# Patient Record
Sex: FEMALE | Race: WHITE | URBAN - METROPOLITAN AREA
[De-identification: names, ages, dates, MRNs, and addresses within clinical notes are randomized per-mention and may not be internally consistent; named-entity substitution may affect disease eponyms.]

---

## 2017-03-22 ENCOUNTER — OFFICE VISIT (OUTPATIENT)
Dept: INTERNAL MEDICINE CLINIC | Facility: CLINIC | Age: 27
End: 2017-03-22

## 2017-03-22 VITALS
HEIGHT: 59 IN | HEART RATE: 68 BPM | TEMPERATURE: 99 F | BODY MASS INDEX: 26.41 KG/M2 | WEIGHT: 131 LBS | RESPIRATION RATE: 16 BRPM | DIASTOLIC BLOOD PRESSURE: 70 MMHG | SYSTOLIC BLOOD PRESSURE: 120 MMHG

## 2017-03-22 DIAGNOSIS — Z30.018 ENCOUNTER FOR INITIAL PRESCRIPTION OF OTHER CONTRACEPTIVES: Primary | ICD-10-CM

## 2017-03-22 LAB
CONTROL LINE PRESENT WITH A CLEAR BACKGROUND (YES/NO): YES YES/NO
KIT LOT #: NORMAL NUMERIC

## 2017-03-22 PROCEDURE — 81025 URINE PREGNANCY TEST: CPT | Performed by: FAMILY MEDICINE

## 2017-03-22 PROCEDURE — 99203 OFFICE O/P NEW LOW 30 MIN: CPT | Performed by: FAMILY MEDICINE

## 2017-03-22 RX ORDER — LEVONORGESTREL AND ETHINYL ESTRADIOL 0.1-0.02MG
1 KIT ORAL DAILY
Qty: 3 PACKAGE | Refills: 3 | Status: SHIPPED | OUTPATIENT
Start: 2017-03-22 | End: 2018-01-15

## 2017-03-23 NOTE — PATIENT INSTRUCTIONS
Birth Control: The Pill    Birth control pills contain hormones that help prevent pregnancy. The pills are prescribed by your health care provider. There are many types of birth control pills available.  If you have side effects from one type of pill, tel In these cases, discuss the risks with your health care provider. Date Last Reviewed: 5/12/2015  © 2772-0296 The [address] [place] [address], [address] [place], [place] [address]. All rights reserved.  This information is not intended as a substitute for pr

## 2017-03-23 NOTE — PROGRESS NOTES
HPI:    Patient ID: Eric Foster is a 23year old female. HPI Here to establish care and for request to restart OCPs. Patient has been on in the past and tolerated well.  Stopped because she found out her mom has Factor V Leiden mutation and patient was Negative for adenopathy. Does not bruise/bleed easily. Psychiatric/Behavioral: Negative for dysphoric mood. The patient is not nervous/anxious.                Current Outpatient Prescriptions:  Levonorgestrel-Ethinyl Estrad 0.1-20 MG-MCG Oral Tab Take 1 t

## 2017-07-03 ENCOUNTER — OFFICE VISIT (OUTPATIENT)
Dept: INTERNAL MEDICINE CLINIC | Facility: CLINIC | Age: 27
End: 2017-07-03

## 2017-07-03 VITALS
WEIGHT: 136 LBS | DIASTOLIC BLOOD PRESSURE: 60 MMHG | HEIGHT: 59 IN | SYSTOLIC BLOOD PRESSURE: 98 MMHG | HEART RATE: 72 BPM | RESPIRATION RATE: 12 BRPM | TEMPERATURE: 98 F | BODY MASS INDEX: 27.42 KG/M2

## 2017-07-03 DIAGNOSIS — Z30.41 ENCOUNTER FOR SURVEILLANCE OF CONTRACEPTIVE PILLS: ICD-10-CM

## 2017-07-03 DIAGNOSIS — R21 RASH: Primary | ICD-10-CM

## 2017-07-03 PROCEDURE — 90471 IMMUNIZATION ADMIN: CPT | Performed by: FAMILY MEDICINE

## 2017-07-03 PROCEDURE — 90715 TDAP VACCINE 7 YRS/> IM: CPT | Performed by: FAMILY MEDICINE

## 2017-07-03 PROCEDURE — 99213 OFFICE O/P EST LOW 20 MIN: CPT | Performed by: FAMILY MEDICINE

## 2017-07-03 PROCEDURE — 90651 9VHPV VACCINE 2/3 DOSE IM: CPT | Performed by: FAMILY MEDICINE

## 2017-07-03 PROCEDURE — 90472 IMMUNIZATION ADMIN EACH ADD: CPT | Performed by: FAMILY MEDICINE

## 2017-07-03 NOTE — PATIENT INSTRUCTIONS
Understanding Cancer Vaccines  Vaccines are mainly used to help prevent you from getting a certain illness or disease. New vaccines are being researched and developed to work against cancer. Currently, there are 3 anti-cancer vaccines.  One is used to pre Possible side effects of cancer vaccines  Cancer vaccines may cause side effects.  These may include:  · Problems at the injection site (bleeding, infection, redness, pain, swelling)  · Itching or rash  · Fever  · Headache  · Fatigue (tiredness)  · Muscle a

## 2017-07-03 NOTE — PROGRESS NOTES
HPI:    Patient ID: Emmanuel Huerta is a 23year old female. HPI Here for f/u on OCPs. Patient has been taking as prescribed with no issues. Would like to continue taking.    Also notes rash at bra-line that patient has had for a couple of weeks she thinks

## 2017-09-05 ENCOUNTER — NURSE ONLY (OUTPATIENT)
Dept: INTERNAL MEDICINE CLINIC | Facility: CLINIC | Age: 27
End: 2017-09-05

## 2017-09-05 PROCEDURE — 90651 9VHPV VACCINE 2/3 DOSE IM: CPT | Performed by: INTERNAL MEDICINE

## 2017-09-05 PROCEDURE — 90471 IMMUNIZATION ADMIN: CPT | Performed by: INTERNAL MEDICINE

## 2018-01-10 ENCOUNTER — TELEPHONE (OUTPATIENT)
Dept: INTERNAL MEDICINE CLINIC | Facility: CLINIC | Age: 28
End: 2018-01-10

## 2018-01-15 ENCOUNTER — NURSE ONLY (OUTPATIENT)
Dept: INTERNAL MEDICINE CLINIC | Facility: CLINIC | Age: 28
End: 2018-01-15

## 2018-01-15 PROCEDURE — 90651 9VHPV VACCINE 2/3 DOSE IM: CPT | Performed by: FAMILY MEDICINE

## 2018-01-15 PROCEDURE — 90471 IMMUNIZATION ADMIN: CPT | Performed by: FAMILY MEDICINE

## 2018-01-15 RX ORDER — LEVONORGESTREL AND ETHINYL ESTRADIOL 0.1-0.02MG
1 KIT ORAL DAILY
Qty: 84 TABLET | Refills: 0 | Status: SHIPPED | OUTPATIENT
Start: 2018-01-15 | End: 2018-04-08

## 2018-01-15 NOTE — TELEPHONE ENCOUNTER
E request  Medication(s) to Refill:   Pending Prescriptions Disp Refills    AVIANE 0.1-20 MG-MCG Oral Tab [Pharmacy Med Name: AVIANE-28 TABLETS 28] 84 tablet 0     Sig: TAKE 1 TABLET BY MOUTH DAILY           Last Time Medication was Filled:  03/22/17- 3 pa

## 2018-04-03 ENCOUNTER — TELEPHONE (OUTPATIENT)
Dept: INTERNAL MEDICINE CLINIC | Facility: CLINIC | Age: 28
End: 2018-04-03

## 2018-04-03 DIAGNOSIS — Z13.1 SCREENING FOR DIABETES MELLITUS: ICD-10-CM

## 2018-04-03 DIAGNOSIS — Z13.220 SCREENING, LIPID: Primary | ICD-10-CM

## 2018-04-03 DIAGNOSIS — Z13.0 SCREENING FOR DEFICIENCY ANEMIA: ICD-10-CM

## 2018-04-03 NOTE — TELEPHONE ENCOUNTER
CPE Future Appointments  Date Time Provider Rosette Haywood   4/11/2018 6:00 PM Shawanda Vila DO [facility]     Orders to Mary Willis  aware must fast no call back required

## 2018-04-05 ENCOUNTER — LAB ENCOUNTER (OUTPATIENT)
Dept: LAB | Age: 28
End: 2018-04-05
Attending: FAMILY MEDICINE
Payer: COMMERCIAL

## 2018-04-05 DIAGNOSIS — Z13.220 SCREENING, LIPID: ICD-10-CM

## 2018-04-05 DIAGNOSIS — Z13.1 SCREENING FOR DIABETES MELLITUS: ICD-10-CM

## 2018-04-05 DIAGNOSIS — Z13.0 SCREENING FOR DEFICIENCY ANEMIA: ICD-10-CM

## 2018-04-05 PROCEDURE — 85025 COMPLETE CBC W/AUTO DIFF WBC: CPT | Performed by: FAMILY MEDICINE

## 2018-04-05 PROCEDURE — 80061 LIPID PANEL: CPT | Performed by: FAMILY MEDICINE

## 2018-04-05 PROCEDURE — 80053 COMPREHEN METABOLIC PANEL: CPT | Performed by: FAMILY MEDICINE

## 2018-04-09 RX ORDER — LEVONORGESTREL AND ETHINYL ESTRADIOL 0.1-0.02MG
1 KIT ORAL DAILY
Qty: 84 TABLET | Refills: 0 | Status: SHIPPED | OUTPATIENT
Start: 2018-04-09 | End: 2018-07-03

## 2018-04-09 NOTE — TELEPHONE ENCOUNTER
[phone]  Called pt stating thinks last pap 3 or 4 years ago- normal. Noted and confirmed CPE on 4/11/18. Pt verbalized understanding and agreed with POC.

## 2018-04-09 NOTE — TELEPHONE ENCOUNTER
Please call patient. When was her last pap smear? If > 3years ago she should schedule a physical with pap. If less than 3 years and normal, she can wait.

## 2018-04-09 NOTE — TELEPHONE ENCOUNTER
E request  Medication(s) to Refill:   Pending Prescriptions Disp Refills    AVIANE 0.1-20 MG-MCG Oral Tab [Pharmacy Med Name: AVIANE-28 TABLETS 28] 84 tablet 0     Sig: TAKE 1 TABLET BY MOUTH DAILY             Reason for Medication Refill being sent to [facility]

## 2018-04-11 ENCOUNTER — OFFICE VISIT (OUTPATIENT)
Dept: INTERNAL MEDICINE CLINIC | Facility: CLINIC | Age: 28
End: 2018-04-11

## 2018-04-11 VITALS
HEIGHT: 59 IN | WEIGHT: 148 LBS | BODY MASS INDEX: 29.84 KG/M2 | RESPIRATION RATE: 16 BRPM | SYSTOLIC BLOOD PRESSURE: 100 MMHG | HEART RATE: 60 BPM | TEMPERATURE: 98 F | DIASTOLIC BLOOD PRESSURE: 62 MMHG

## 2018-04-11 DIAGNOSIS — E78.2 MIXED HYPERLIPIDEMIA: ICD-10-CM

## 2018-04-11 DIAGNOSIS — Z00.00 ANNUAL PHYSICAL EXAM: Primary | ICD-10-CM

## 2018-04-11 DIAGNOSIS — Z12.4 CERVICAL CANCER SCREENING: ICD-10-CM

## 2018-04-11 PROCEDURE — 87625 HPV TYPES 16 & 18 ONLY: CPT | Performed by: FAMILY MEDICINE

## 2018-04-11 PROCEDURE — 87591 N.GONORRHOEAE DNA AMP PROB: CPT | Performed by: FAMILY MEDICINE

## 2018-04-11 PROCEDURE — 87624 HPV HI-RISK TYP POOLED RSLT: CPT | Performed by: FAMILY MEDICINE

## 2018-04-11 PROCEDURE — 99395 PREV VISIT EST AGE 18-39: CPT | Performed by: FAMILY MEDICINE

## 2018-04-11 PROCEDURE — 87491 CHLMYD TRACH DNA AMP PROBE: CPT | Performed by: FAMILY MEDICINE

## 2018-04-12 NOTE — PROGRESS NOTES
HPI:    Patient ID: Eric Foster is a 33year old female. HPI Here for annual check-up. Patient has no complaints. Trying to eat healthy and exercise. Taking OCPs as prescribed with no issues. History reviewed. No pertinent past medical history.   H PHYSICAL EXAM:   Physical Exam   Constitutional: She is oriented to person, place, and time. She appears well-developed and well-nourished. HENT:   Head: Normocephalic and atraumatic. Eyes: Conjunctivae are normal.   Neck: Normal range of motion.  Nec

## 2018-04-17 ENCOUNTER — TELEPHONE (OUTPATIENT)
Dept: OBGYN CLINIC | Facility: CLINIC | Age: 28
End: 2018-04-17

## 2018-04-17 NOTE — TELEPHONE ENCOUNTER
PT states she was referred to see Dr. Amelie Soliz for abnormal pap. Can you please review her labs and advice what appointment you would like scheduled.

## 2018-04-18 NOTE — TELEPHONE ENCOUNTER
Please scheduled patient as colposcopy procedure visit but since patient is new please have 30 minute blocked time for this patient. Thank you.

## 2018-04-19 NOTE — TELEPHONE ENCOUNTER
Patient informed of colposcopy procedure. Verbalized understanding. No further questions or concerns.

## 2018-05-10 ENCOUNTER — OFFICE VISIT (OUTPATIENT)
Dept: OBGYN CLINIC | Facility: CLINIC | Age: 28
End: 2018-05-10

## 2018-05-10 VITALS
WEIGHT: 145 LBS | HEIGHT: 59 IN | SYSTOLIC BLOOD PRESSURE: 100 MMHG | DIASTOLIC BLOOD PRESSURE: 60 MMHG | BODY MASS INDEX: 29.23 KG/M2

## 2018-05-10 DIAGNOSIS — R87.810 ASCUS WITH POSITIVE HIGH RISK HPV CERVICAL: ICD-10-CM

## 2018-05-10 DIAGNOSIS — R87.610 ASCUS WITH POSITIVE HIGH RISK HPV CERVICAL: ICD-10-CM

## 2018-05-10 DIAGNOSIS — Z32.02 PREGNANCY EXAMINATION OR TEST, NEGATIVE RESULT: Primary | ICD-10-CM

## 2018-05-10 PROCEDURE — 88305 TISSUE EXAM BY PATHOLOGIST: CPT | Performed by: OBSTETRICS & GYNECOLOGY

## 2018-05-10 PROCEDURE — 81025 URINE PREGNANCY TEST: CPT | Performed by: OBSTETRICS & GYNECOLOGY

## 2018-05-10 PROCEDURE — 57454 BX/CURETT OF CERVIX W/SCOPE: CPT | Performed by: OBSTETRICS & GYNECOLOGY

## 2018-05-10 NOTE — PATIENT INSTRUCTIONS
[facility] [place] [facility]  After Care Instructions for Colposcopy/Biopsy      Biopsy Results   You will receive a phone call with your biopsy results in 7 business days. If you have not received your biopsy results in 7 days, please contact our office.   Anuj Wiggins

## 2018-05-10 NOTE — PROGRESS NOTES
Patient presents for scheduled colposcopy 2/2 ASCUS with other high risk HPV positive noted on pap smear on 04/11/18. Discussion held with the patient regarding pap smear findings and recommendations.  Patient recommended colposcopy with cervical biopsies a

## 2018-05-10 NOTE — PROCEDURES
Colposcopy Procedure Note    Date of Procedure: 05/10/18    Indications: 33year old y/o female with ASCUS and other high risk HPV positive     Pre-procedure diagnosis:   ASCUS, other high risk HPV positive     Post-procedure diagnosis:  ASCUS, other high

## 2018-05-21 NOTE — PROGRESS NOTES
Please inform the patient that colposcopy results are noted for RICKIE 1 which is what we discussed in office. Repeat pap smear with co-testing in 1 year.

## 2018-07-03 RX ORDER — LEVONORGESTREL AND ETHINYL ESTRADIOL 0.1-0.02MG
1 KIT ORAL DAILY
Qty: 84 TABLET | Refills: 3 | Status: SHIPPED | OUTPATIENT
Start: 2018-07-03 | End: 2019-06-10

## 2019-06-10 RX ORDER — LEVONORGESTREL AND ETHINYL ESTRADIOL 0.1-0.02MG
1 KIT ORAL DAILY
Qty: 84 TABLET | Refills: 0 | Status: SHIPPED | OUTPATIENT
Start: 2019-06-10

## 2019-06-10 NOTE — TELEPHONE ENCOUNTER
Protocol failed due to Physical or Pelvic/Breast in past 12 or next 3 mos--VIA MANUAL LOOKUP    Please advise,    LOV:4/11/18   FOV:none on file   LAST RX:7/3/1180.1-20 mg-mcg take 2 tab daily 84 tabs 3 refills   LAST LABS: 5/10/18 surgical pathology,urine

## 2019-06-10 NOTE — TELEPHONE ENCOUNTER
Please call patient. I sent refill for her OCPs but she is due for repeat pap smear- she can do this with me or her Gyne but she needs to schedule that ASAP.

## (undated) NOTE — LETTER
Dahlia Escalona, :1990    CONSENT FOR PROCEDURE/SEDATION    1. I authorize the performance upon Dahlia Escalona  the following: Colposcopy with biopsy and Endocervical curettage    2.  I authorize Dr. Lin Rivera MD (and whomever is designated as the Relationship to patient: ____________________________________________    Witness: _________________________________________ Date:___________     Physician Signature: _______________________________ Date:___________